# Patient Record
Sex: MALE | Race: BLACK OR AFRICAN AMERICAN | Employment: FULL TIME | URBAN - METROPOLITAN AREA
[De-identification: names, ages, dates, MRNs, and addresses within clinical notes are randomized per-mention and may not be internally consistent; named-entity substitution may affect disease eponyms.]

---

## 2019-04-10 ENCOUNTER — OFFICE VISIT (OUTPATIENT)
Dept: UROLOGY | Age: 44
End: 2019-04-10

## 2019-04-10 VITALS
HEART RATE: 78 BPM | WEIGHT: 238 LBS | OXYGEN SATURATION: 96 % | HEIGHT: 71 IN | BODY MASS INDEX: 33.32 KG/M2 | DIASTOLIC BLOOD PRESSURE: 84 MMHG | SYSTOLIC BLOOD PRESSURE: 147 MMHG

## 2019-04-10 DIAGNOSIS — L73.2 HIDRADENITIS SUPPURATIVA: Primary | ICD-10-CM

## 2019-04-10 DIAGNOSIS — N49.2 SCROTAL ABSCESS: ICD-10-CM

## 2019-04-10 DIAGNOSIS — Z80.42 FAMILY HISTORY OF PROSTATE CANCER: ICD-10-CM

## 2019-04-10 LAB
BILIRUB UR QL STRIP: NORMAL
GLUCOSE UR-MCNC: NEGATIVE MG/DL
KETONES P FAST UR STRIP-MCNC: NORMAL MG/DL
PH UR STRIP: 7 [PH] (ref 4.6–8)
PROT UR QL STRIP: NORMAL
SP GR UR STRIP: 1.02 (ref 1–1.03)
UA UROBILINOGEN AMB POC: NORMAL (ref 0.2–1)
URINALYSIS CLARITY POC: CLEAR
URINALYSIS COLOR POC: YELLOW
URINE BLOOD POC: NEGATIVE
URINE LEUKOCYTES POC: NEGATIVE
URINE NITRITES POC: NEGATIVE

## 2019-04-11 LAB — PSA SERPL-MCNC: 0.4 NG/ML (ref 0–4)

## 2019-04-11 NOTE — PROGRESS NOTES
Agusto Gonzáles 41 y.o. male Mr. Smith Ask seen today for evaluation of scrotal induration edema and weeping discharge-  status post perineal scrotal abscess  drainage in 2013- 
Currently has nontender swelling and induration of the posterior aspect of the lower scrotal midline-no voiding symptoms-no urgency frequency dysuria or hematuria Family history-paternal uncle dying of prostate carcinoma at age 79 Review of Systems:  
CNS: No seizures syncope headaches dizziness or visual changes Respiratory: No wheezing shortness of breath chest pain or coughing Cardiovascular: No palpitations no angina Intestinal: No dyspepsia diarrhea or constipation Urinary: No urgency frequency dysuria or hematuria Skeletal: No bone or joint pain Endocrine: No diabetes or thyroid disease Other: 
 
Allergies: No Known Allergies Medications:   
Current Outpatient Medications Medication Sig Dispense Refill  Multivitamins with Fluoride (MULTI-VITAMIN PO) Take  by mouth. Past Medical History:  
Diagnosis Date  Hypertension Past Surgical History:  
Procedure Laterality Date  IR PUNCTURE DRAIN OF LESION Left   
 scrotum Social History Socioeconomic History  Marital status:  Spouse name: Not on file  Number of children: Not on file  Years of education: Not on file  Highest education level: Not on file Occupational History  Not on file Social Needs  Financial resource strain: Not on file  Food insecurity:  
  Worry: Not on file Inability: Not on file  Transportation needs:  
  Medical: Not on file Non-medical: Not on file Tobacco Use  Smoking status: Current Every Day Smoker  Smokeless tobacco: Never Used Substance and Sexual Activity  Alcohol use: Yes  Drug use: Never  Sexual activity: Yes Lifestyle  Physical activity:  
  Days per week: Not on file Minutes per session: Not on file  Stress: Not on file Relationships  Social connections:  
  Talks on phone: Not on file Gets together: Not on file Attends [employer] service: Not on file Active member of club or organization: Not on file Attends meetings of clubs or organizations: Not on file Relationship status: Not on file  Intimate partner violence:  
  Fear of current or ex partner: Not on file Emotionally abused: Not on file Physically abused: Not on file Forced sexual activity: Not on file Other Topics Concern  Not on file Social History Narrative  Not on file Family History Problem Relation Age of Onset  Hypertension Mother Physical Examination: Well-nourished mature male in no apparent distress Abdomen is nontender no palpable masses no organomegaly Back-no percussion CVA tenderness on either side No inguinal hernia or adenopathy Penis is normal 
Testes are normal in size shape and consistency bilaterally-no epididymal induration or tenderness on either side Spermatic cords are palpably normal bilaterally Scrotum is normal for nontender induration and no scrotal wall thickening affecting the posterior lower scrotal midline-no active drainage or inflammation Prostate by TERESA is rounded, smooth, benign in consistency and nontender-no induration no nodularity No rectal masses tenderness or induration Urinalysis: Negative dipstick/nitrite negative/heme-negative Impression: Recurrent scrotal skin lesions-suspect hidradenitis suppurativa-currently quiescent Plan: PSA today RTC as needed active lesions Refer to Internet for information regarding diagnosis of hidradenitis suppurativa- 
 
 
 
Debbie Walsh MD 
-electronically signed- 
 
PLEASE NOTE: 
This document has been produced using voice recognition software. Unrecognized errors in transcription may be present.

## 2019-09-25 PROBLEM — N49.2 SCROTAL ABSCESS: Status: ACTIVE | Noted: 2019-09-25

## 2019-10-23 ENCOUNTER — OFFICE VISIT (OUTPATIENT)
Dept: UROLOGY | Age: 44
End: 2019-10-23

## 2019-10-23 VITALS
SYSTOLIC BLOOD PRESSURE: 132 MMHG | HEIGHT: 71 IN | WEIGHT: 239 LBS | HEART RATE: 78 BPM | DIASTOLIC BLOOD PRESSURE: 88 MMHG | OXYGEN SATURATION: 98 % | BODY MASS INDEX: 33.46 KG/M2

## 2019-10-23 DIAGNOSIS — N49.2 SCROTAL ABSCESS: ICD-10-CM

## 2019-10-23 DIAGNOSIS — N50.9 SCROTAL LESION: Primary | ICD-10-CM

## 2019-10-23 DIAGNOSIS — L73.2 HIDRADENITIS SUPPURATIVA: ICD-10-CM

## 2019-10-23 LAB
BILIRUB UR QL STRIP: NEGATIVE
GLUCOSE UR-MCNC: NEGATIVE MG/DL
KETONES P FAST UR STRIP-MCNC: NEGATIVE MG/DL
PH UR STRIP: 7 [PH] (ref 4.6–8)
PROT UR QL STRIP: NEGATIVE
SP GR UR STRIP: 1.02 (ref 1–1.03)
UA UROBILINOGEN AMB POC: NORMAL (ref 0.2–1)
URINALYSIS CLARITY POC: CLEAR
URINALYSIS COLOR POC: YELLOW
URINE BLOOD POC: NEGATIVE
URINE LEUKOCYTES POC: NEGATIVE
URINE NITRITES POC: NEGATIVE

## 2019-10-23 RX ORDER — ERGOCALCIFEROL 1.25 MG/1
50000 CAPSULE ORAL
COMMUNITY

## 2019-10-23 NOTE — PROGRESS NOTES
Mr. Valeria Hampton has a reminder for a \"due or due soon\" health maintenance. I have asked that he contact his primary care provider for follow-up on this health maintenance.

## 2019-10-23 NOTE — PATIENT INSTRUCTIONS
Learning About the Male Reproductive System  What does the male reproductive system do? The male reproductive system makes sperm and delivers it out of the body. It allows a man to have sex and father children. This system is made up of:  · The penis. · Two testicles (one on each side). This is where sperm is made. · The scrotum. This is a sac at the base of the penis that holds the testicles. · The epididymis, where the sperm mature. There is one on each side. · Two seminal vesicles and the prostate gland. They make the liquid semen that carries the sperm. · Two vas deferens tubes, one on each side of the body. They carry semen to the urethra. That's where it leaves the penis during sex. What problems can happen with the reproductive system? Problems may include:  · Injuries to the genitals. This could happen when you play sports, do other activities, or take a fall. · Infections of the testicle (orchitis), epididymis (epididymitis), prostate gland (prostatitis), or other areas. · Sexually transmitted infections (STIs). These include chlamydia, genital herpes, genital warts, and gonorrhea. · Sperm problems. They could cause you to be infertile. · Erection problems (impotence or erectile dysfunction). · Torsion of a testicle. This emergency happens when a testicle twists in the scrotum. The twisting can cut off its blood supply. · Cancer of the testicle or prostate gland. How can you prevent reproductive system problems? · If you play contact sports, make sure to wear a cup or other protection for your genitals. · Having one sex partner (who does not have STIs and does not have sex with anyone else) is a good way to avoid STIs. · Do not smoke. Smoking can lower your overall sexual health. If you need help quitting, talk to your doctor about stop-smoking programs and medicines. These can increase your chances of quitting for good. Where can you learn more?   Go to http://charity-antonia.info/. Enter T608 in the search box to learn more about \"Learning About the Male Reproductive System. \"  Current as of: May 28, 2019  Content Version: 12.2  © 2097-8354 [employer]. Care instructions adapted under license by [employer] (which disclaims liability or warranty for this information). If you have questions about a medical condition or this instruction, always ask your healthcare professional. [place] [address] any warranty or liability for your use of this information.

## 2019-10-24 NOTE — PROGRESS NOTES
Agusto Gonzáles 45 y.o. male     Mr. Ramirez Homes seen today for follow-up hidradenitis supurativa    Interval history I&D upper inguinoscrotal abscess at [facility] on 25 September 2019 by Dr. Dayo Ramsey      Initially presenting in April 2019 with scrotal induration edema and weeping discharge-  status post perineal scrotal abscess  drainage in 2013-  Currently has nontender swelling and induration of the posterior aspect of the lower scrotal midline-no voiding symptoms-no urgency frequency dysuria or hematuria     Family history-paternal uncle dying of prostate carcinoma at age 79     Review of Systems:   CNS: No seizures syncope headaches dizziness or visual changes  Respiratory: No wheezing shortness of breath chest pain or coughing  Cardiovascular: No palpitations no angina  Intestinal: No dyspepsia diarrhea or constipation  Urinary: No urgency frequency dysuria or hematuria  Skeletal: No bone or joint pain  Endocrine: No diabetes or thyroid disease  Other:  Allergies: Allergies   Allergen Reactions    Bactrim [Sulfamethoprim] Itching    Sulfa (Sulfonamide Antibiotics) Itching      Medications:    Current Outpatient Medications   Medication Sig Dispense Refill    ergocalciferol (VITAMIN D2) 50,000 unit capsule Take 50,000 Units by mouth.  amLODIPine (NORVASC) 5 mg tablet Take 1 Tab by mouth daily. 30 Tab 0    Multivitamins with Fluoride (MULTI-VITAMIN PO) Take  by mouth.  docusate sodium (COLACE) 100 mg capsule Take 1 Cap by mouth two (2) times daily as needed for Constipation for up to 90 days. 30 Cap 2    acetaminophen (TYLENOL) 500 mg tablet Take 1 Tab by mouth every six (6) hours as needed for Pain. 60 Tab 0    amoxicillin-clavulanate (AUGMENTIN) 875-125 mg per tablet Take 1 Tab by mouth every twelve (12) hours.  14 Tab 0       Past Medical History:   Diagnosis Date    Hypertension       Past Surgical History:   Procedure Laterality Date    IR PUNCTURE DRAIN OF LESION Left     scrotum      Social History     Socioeconomic History    Marital status:      Spouse name: Not on file    Number of children: Not on file    Years of education: Not on file    Highest education level: Not on file   Occupational History    Not on file   Social Needs    Financial resource strain: Not on file    Food insecurity:     Worry: Not on file     Inability: Not on file    Transportation needs:     Medical: Not on file     Non-medical: Not on file   Tobacco Use    Smoking status: Current Every Day Smoker    Smokeless tobacco: Never Used   Substance and Sexual Activity    Alcohol use:  Yes    Drug use: Never    Sexual activity: Yes   Lifestyle    Physical activity:     Days per week: Not on file     Minutes per session: Not on file    Stress: Not on file   Relationships    Social connections:     Talks on phone: Not on file     Gets together: Not on file     Attends Gnosticism service: Not on file     Active member of club or organization: Not on file     Attends meetings of clubs or organizations: Not on file     Relationship status: Not on file    Intimate partner violence:     Fear of current or ex partner: Not on file     Emotionally abused: Not on file     Physically abused: Not on file     Forced sexual activity: Not on file   Other Topics Concern    Not on file   Social History Narrative    Not on file      Family History   Problem Relation Age of Onset    Hypertension Mother         Physical Examination: Well-nourished mature male in no apparent distress    3 cm elliptical open clean incision right inguinal scrotal region- debridement by frequent gauze dressing changes  No scrotal erythema ecchymosis or swelling      Urinalysis: Negative dipstick/nitrite negative/heme-negative    Impression: Scrotal abscess stable status post I&D with granulating I&D incision                        Hidradenitis supurativa      Plan: Fluffy gauze dressing changes 3 times daily    RTC 6 months      More than 1/2 of this 15 minute visit was spent in counselling and coordination of care, as described above. Alee Morrow MD  -electronically signed-    PLEASE NOTE:  This document has been produced using voice recognition software. Unrecognized errors in transcription may be present.